# Patient Record
Sex: FEMALE | Race: WHITE | NOT HISPANIC OR LATINO
[De-identification: names, ages, dates, MRNs, and addresses within clinical notes are randomized per-mention and may not be internally consistent; named-entity substitution may affect disease eponyms.]

---

## 2021-02-12 ENCOUNTER — TRANSCRIPTION ENCOUNTER (OUTPATIENT)
Age: 56
End: 2021-02-12

## 2021-12-10 PROBLEM — Z00.00 ENCOUNTER FOR PREVENTIVE HEALTH EXAMINATION: Status: ACTIVE | Noted: 2021-12-10

## 2021-12-17 ENCOUNTER — NON-APPOINTMENT (OUTPATIENT)
Age: 56
End: 2021-12-17

## 2021-12-17 ENCOUNTER — APPOINTMENT (OUTPATIENT)
Dept: CARDIOLOGY | Facility: CLINIC | Age: 56
End: 2021-12-17
Payer: COMMERCIAL

## 2021-12-17 VITALS
HEART RATE: 96 BPM | RESPIRATION RATE: 14 BRPM | DIASTOLIC BLOOD PRESSURE: 82 MMHG | SYSTOLIC BLOOD PRESSURE: 164 MMHG | OXYGEN SATURATION: 98 % | BODY MASS INDEX: 21.17 KG/M2 | HEIGHT: 64 IN | TEMPERATURE: 98.2 F | WEIGHT: 124 LBS

## 2021-12-17 DIAGNOSIS — R94.31 ABNORMAL ELECTROCARDIOGRAM [ECG] [EKG]: ICD-10-CM

## 2021-12-17 PROCEDURE — 93000 ELECTROCARDIOGRAM COMPLETE: CPT

## 2021-12-17 PROCEDURE — 99204 OFFICE O/P NEW MOD 45 MIN: CPT | Mod: 25

## 2022-01-03 ENCOUNTER — NON-APPOINTMENT (OUTPATIENT)
Age: 57
End: 2022-01-03

## 2022-01-03 ENCOUNTER — APPOINTMENT (OUTPATIENT)
Dept: CARDIOLOGY | Facility: CLINIC | Age: 57
End: 2022-01-03
Payer: COMMERCIAL

## 2022-01-03 PROCEDURE — 93306 TTE W/DOPPLER COMPLETE: CPT

## 2022-01-10 NOTE — HISTORY OF PRESENT ILLNESS
[FreeTextEntry1] : 55 y/o w/ no past medical history\par \par requested evaluation for abnormal lipid panel\par \par Results:\par \par \par TG 64\par HDL 70\par [id number]\par \par BPs elevated today but pt was anxious prior BPs 120s/70s\par normal range.\par Pt denies any cardiac symptoms: no chest pain, dyspnea, palpitations, syncope, lightheadness, LE edema.\par \par Exercises >3x a week w/ a .\par Mom w/ stents in mid 60s otherwise no family history of cardiac disease.\par No hx of smoking ETOH illicits.\par \par ECG today w/ NSR & septal infarct pattern.\par This would not resolve w/ carefull attention to lead placement.

## 2022-01-10 NOTE — ASSESSMENT
[FreeTextEntry1] : A/P:\par \par *abnormal lipids\par -LDL elevated at 158\par but ASCVD risk 2.7% - not an indication for statin treatment.\par -Would only consider statin therapy w/ ASCVD risk 10%\par -Not even a level of risk in which coronary calcium scoring\par Lp (a) or cardiac CRP would be indicated - would consider\par this at 5-10%. \par \par -cont. to monitor lipids\par -Consider reevaluation & statin therapy\par if develops DM, HTN, or LDL >180-190.\par \par *abnormal ECG\par -septal infarct pattern, did not change\par w/ lead repositioning, doubt actual MI\par \par -Echo ordered to r/o pathology.\par Pt will call to review results.\par No return apt scheduled.\par ===================\par 1/10//'21\par Echo reviewed normal EF no RWMA\par no evidence of "septal infarct" seen on on ECG\par reviewed results w/ pt.\par

## 2022-06-12 ENCOUNTER — NON-APPOINTMENT (OUTPATIENT)
Age: 57
End: 2022-06-12

## 2022-06-14 ENCOUNTER — NON-APPOINTMENT (OUTPATIENT)
Age: 57
End: 2022-06-14

## 2022-10-07 ENCOUNTER — APPOINTMENT (OUTPATIENT)
Dept: CARDIOLOGY | Facility: CLINIC | Age: 57
End: 2022-10-07

## 2025-03-12 ENCOUNTER — EMERGENCY (EMERGENCY)
Facility: HOSPITAL | Age: 60
LOS: 0 days | Discharge: HOME | End: 2025-03-12
Attending: EMERGENCY MEDICINE
Payer: COMMERCIAL

## 2025-03-12 ENCOUNTER — TRANSCRIPTION ENCOUNTER (OUTPATIENT)
Age: 60
End: 2025-03-12

## 2025-03-12 VITALS — HEIGHT: 63 IN | WEIGHT: 135.14 LBS

## 2025-03-12 VITALS
HEART RATE: 93 BPM | OXYGEN SATURATION: 98 % | RESPIRATION RATE: 18 BRPM | DIASTOLIC BLOOD PRESSURE: 86 MMHG | SYSTOLIC BLOOD PRESSURE: 157 MMHG | TEMPERATURE: 98 F

## 2025-03-12 DIAGNOSIS — Z91.010 ALLERGY TO PEANUTS: ICD-10-CM

## 2025-03-12 DIAGNOSIS — H53.2 DIPLOPIA: ICD-10-CM

## 2025-03-12 LAB
ALBUMIN SERPL ELPH-MCNC: 4.3 G/DL — SIGNIFICANT CHANGE UP (ref 3.3–5)
ALP SERPL-CCNC: 100 U/L — SIGNIFICANT CHANGE UP (ref 40–120)
ALT FLD-CCNC: 19 U/L — SIGNIFICANT CHANGE UP (ref 12–78)
ANION GAP SERPL CALC-SCNC: 7 MMOL/L — SIGNIFICANT CHANGE UP (ref 5–17)
APTT BLD: 29.9 SEC — SIGNIFICANT CHANGE UP (ref 24.5–35.6)
AST SERPL-CCNC: 20 U/L — SIGNIFICANT CHANGE UP (ref 15–37)
B BURGDOR C6 AB SER-ACNC: NEGATIVE — SIGNIFICANT CHANGE UP
B BURGDOR IGG+IGM SER QL IB: SIGNIFICANT CHANGE UP
B BURGDOR IGG+IGM SER-ACNC: 0.33 INDEX — SIGNIFICANT CHANGE UP (ref 0.01–0.9)
BASOPHILS # BLD AUTO: 0.05 K/UL — SIGNIFICANT CHANGE UP (ref 0–0.2)
BASOPHILS NFR BLD AUTO: 0.7 % — SIGNIFICANT CHANGE UP (ref 0–2)
BILIRUB SERPL-MCNC: 0.6 MG/DL — SIGNIFICANT CHANGE UP (ref 0.2–1.2)
BUN SERPL-MCNC: 18 MG/DL — SIGNIFICANT CHANGE UP (ref 7–23)
CALCIUM SERPL-MCNC: 10 MG/DL — SIGNIFICANT CHANGE UP (ref 8.5–10.1)
CHLORIDE SERPL-SCNC: 103 MMOL/L — SIGNIFICANT CHANGE UP (ref 96–108)
CO2 SERPL-SCNC: 28 MMOL/L — SIGNIFICANT CHANGE UP (ref 22–31)
CREAT SERPL-MCNC: 0.79 MG/DL — SIGNIFICANT CHANGE UP (ref 0.5–1.3)
EGFR: 85 ML/MIN/1.73M2 — SIGNIFICANT CHANGE UP
EOSINOPHIL # BLD AUTO: 0.05 K/UL — SIGNIFICANT CHANGE UP (ref 0–0.5)
EOSINOPHIL NFR BLD AUTO: 0.7 % — SIGNIFICANT CHANGE UP (ref 0–6)
GLUCOSE SERPL-MCNC: 97 MG/DL — SIGNIFICANT CHANGE UP (ref 70–99)
HCT VFR BLD CALC: 42.2 % — SIGNIFICANT CHANGE UP (ref 34.5–45)
HGB BLD-MCNC: 14 G/DL — SIGNIFICANT CHANGE UP (ref 11.5–15.5)
IMM GRANULOCYTES # BLD AUTO: 0.02 K/UL — SIGNIFICANT CHANGE UP (ref 0–0.07)
IMM GRANULOCYTES NFR BLD AUTO: 0.3 % — SIGNIFICANT CHANGE UP (ref 0–0.9)
INR BLD: 0.94 RATIO — SIGNIFICANT CHANGE UP (ref 0.85–1.16)
LYMPHOCYTES # BLD AUTO: 1.54 K/UL — SIGNIFICANT CHANGE UP (ref 1–3.3)
LYMPHOCYTES NFR BLD AUTO: 21.9 % — SIGNIFICANT CHANGE UP (ref 13–44)
MCHC RBC-ENTMCNC: 30.1 PG — SIGNIFICANT CHANGE UP (ref 27–34)
MCHC RBC-ENTMCNC: 33.2 G/DL — SIGNIFICANT CHANGE UP (ref 32–36)
MCV RBC AUTO: 90.8 FL — SIGNIFICANT CHANGE UP (ref 80–100)
MONOCYTES # BLD AUTO: 0.38 K/UL — SIGNIFICANT CHANGE UP (ref 0–0.9)
MONOCYTES NFR BLD AUTO: 5.4 % — SIGNIFICANT CHANGE UP (ref 2–14)
NEUTROPHILS # BLD AUTO: 5 K/UL — SIGNIFICANT CHANGE UP (ref 1.8–7.4)
NEUTROPHILS NFR BLD AUTO: 71 % — SIGNIFICANT CHANGE UP (ref 43–77)
NRBC # BLD AUTO: 0 K/UL — SIGNIFICANT CHANGE UP (ref 0–0)
NRBC # FLD: 0 K/UL — SIGNIFICANT CHANGE UP (ref 0–0)
NRBC BLD AUTO-RTO: 0 /100 WBCS — SIGNIFICANT CHANGE UP (ref 0–0)
PLATELET # BLD AUTO: 375 K/UL — SIGNIFICANT CHANGE UP (ref 150–400)
PMV BLD: 9.9 FL — SIGNIFICANT CHANGE UP (ref 7–13)
POTASSIUM SERPL-MCNC: 3.8 MMOL/L — SIGNIFICANT CHANGE UP (ref 3.5–5.3)
POTASSIUM SERPL-SCNC: 3.8 MMOL/L — SIGNIFICANT CHANGE UP (ref 3.5–5.3)
PROT SERPL-MCNC: 7.7 GM/DL — SIGNIFICANT CHANGE UP (ref 6–8.3)
PROTHROM AB SERPL-ACNC: 11.1 SEC — SIGNIFICANT CHANGE UP (ref 9.9–13.4)
RBC # BLD: 4.65 M/UL — SIGNIFICANT CHANGE UP (ref 3.8–5.2)
RBC # FLD: 12.7 % — SIGNIFICANT CHANGE UP (ref 10.3–14.5)
SODIUM SERPL-SCNC: 138 MMOL/L — SIGNIFICANT CHANGE UP (ref 135–145)
TROPONIN I, HIGH SENSITIVITY RESULT: 3.34 NG/L — SIGNIFICANT CHANGE UP
WBC # BLD: 7.04 K/UL — SIGNIFICANT CHANGE UP (ref 3.8–10.5)
WBC # FLD AUTO: 7.04 K/UL — SIGNIFICANT CHANGE UP (ref 3.8–10.5)

## 2025-03-12 PROCEDURE — 85610 PROTHROMBIN TIME: CPT

## 2025-03-12 PROCEDURE — 36415 COLL VENOUS BLD VENIPUNCTURE: CPT

## 2025-03-12 PROCEDURE — 85730 THROMBOPLASTIN TIME PARTIAL: CPT

## 2025-03-12 PROCEDURE — 80053 COMPREHEN METABOLIC PANEL: CPT

## 2025-03-12 PROCEDURE — 70496 CT ANGIOGRAPHY HEAD: CPT | Mod: 26

## 2025-03-12 PROCEDURE — 93010 ELECTROCARDIOGRAM REPORT: CPT

## 2025-03-12 PROCEDURE — 82962 GLUCOSE BLOOD TEST: CPT

## 2025-03-12 PROCEDURE — 85025 COMPLETE CBC W/AUTO DIFF WBC: CPT

## 2025-03-12 PROCEDURE — 84484 ASSAY OF TROPONIN QUANT: CPT

## 2025-03-12 PROCEDURE — 99285 EMERGENCY DEPT VISIT HI MDM: CPT

## 2025-03-12 PROCEDURE — 71045 X-RAY EXAM CHEST 1 VIEW: CPT

## 2025-03-12 PROCEDURE — 99285 EMERGENCY DEPT VISIT HI MDM: CPT | Mod: 25

## 2025-03-12 PROCEDURE — 86618 LYME DISEASE ANTIBODY: CPT

## 2025-03-12 PROCEDURE — 93005 ELECTROCARDIOGRAM TRACING: CPT

## 2025-03-12 PROCEDURE — 87476 LYME DIS DNA AMP PROBE: CPT

## 2025-03-12 PROCEDURE — 70496 CT ANGIOGRAPHY HEAD: CPT | Mod: MC

## 2025-03-12 PROCEDURE — 70498 CT ANGIOGRAPHY NECK: CPT | Mod: MC

## 2025-03-12 PROCEDURE — 36000 PLACE NEEDLE IN VEIN: CPT | Mod: XU

## 2025-03-12 PROCEDURE — 71045 X-RAY EXAM CHEST 1 VIEW: CPT | Mod: 26

## 2025-03-12 PROCEDURE — 70498 CT ANGIOGRAPHY NECK: CPT | Mod: 26

## 2025-03-12 NOTE — ED STATDOCS - CARE PROVIDER_API CALL
Beulah Barrett  Neurology  [address] [facility], [place] [address]  Phone: [phone]  Fax: [phone]  Follow Up Time:

## 2025-03-12 NOTE — ED ADULT TRIAGE NOTE - CHIEF COMPLAINT QUOTE
Pt presents to ED c/o double vision since Sunday. pt reports double vision worsening, sent in by opthalmology to R/O stroke. Denies PMHX. denies weakness, slurred speech. Denies pain. Dr. Bergeron called for stroke eval. Pt presents to ED c/o double vision since Sunday. pt reports double vision worsening, sent in by opthalmology to R/O stroke. Denies PMHX. denies weakness, slurred speech. Denies pain. Dr. Bergeron called for stroke eval. No code stroke needed

## 2025-03-12 NOTE — ED ADULT NURSE NOTE - CHIEF COMPLAINT QUOTE
Pt presents to ED c/o double vision since Sunday. pt reports double vision worsening, sent in by opthalmology to R/O stroke. Denies PMHX. denies weakness, slurred speech. Denies pain. Dr. Bergeron called for stroke eval. No code stroke needed

## 2025-03-12 NOTE — ED ADULT NURSE NOTE - OBJECTIVE STATEMENT
pt presenting with reports of double vision sudden onset since Sunday. Pt states "its like a light switch went off", pt saw opthalmology and was referred to ER for scans. Pt denies headache, no other deficits, reports dizziness b/c of the double vision.

## 2025-03-12 NOTE — ED STATDOCS - PATIENT PORTAL LINK FT
You can access the FollowMyHealth Patient Portal offered by [facility] by registering at the following website: [email or web address]. By joining [facility]’s FollowMyHealth portal, you will also be able to view your health information using other applications (apps) compatible with our system.

## 2025-03-12 NOTE — ED STATDOCS - PHYSICAL EXAMINATION
Physical Exam:  Gen: NAD, non-toxic appearing, able to ambulate without assistance  Head: NCAT  HEENT: EOMI, PEERLA, normal conjunctiva, tongue midline, oral mucosa moist,  no focal neurological deficits,   Lung: CTAB, no respiratory distress, no wheezes/rhonchi/rales B/L, speaking in full sentences  CV: RRR, no murmurs, rubs or gallops, distal pulses 2+ b/l  Abd: soft, nontender, no distention, no guarding, no rigidity, no rebound tenderness  MSK: no visible deformities, ROM normal in UE/LE  Skin: Warm, well perfused, no rash  Psych: normal affect, calm

## 2025-03-12 NOTE — ED STATDOCS - ATTENDING APP SHARED VISIT CONTRIBUTION OF CARE
I,Robb Go MD,  performed the initial face to face bedside interview with this patient regarding history of present illness, review of symptoms and relevant past medical, social and family history.  I completed an independent physical examination.  I was the initial provider who evaluated this patient. I have signed out the follow up of any pending tests (i.e. labs, radiological studies) to the ACP.  I have communicated the patient’s plan of care and disposition with the ACP.  The history, relevant review of systems, past medical and surgical history, medical decision making, and physical examination was documented by the scribe in my presence and I attest to the accuracy of the documentation.

## 2025-03-12 NOTE — ED STATDOCS - OBJECTIVE STATEMENT
59 y/o F with no PMHx presents to ED SIB opthalmology to r/o stroke. Pt c/o double vision starting 3 days ago. Pt states double vision worsens in afternoon or evening.  Pt states she can see clearly out of L eye alone and out of R eye alone but she sees double vision when seeing out of both eyes.  Pt states she sees things on top of each other vertically. Pt states when she puts her glasses on and then takes them off, she has double vision. Pt had Prenuvo scan Sep/Oct 2024. Pt states she has a lot of deer in her backyard and would like to get tested for Lyme disease.

## 2025-03-12 NOTE — ED STATDOCS - PROGRESS NOTE DETAILS
61 y/o F with no PMHx presents to ED SIB opthalmology to r/o stroke. Pt c/o double vision starting 3 days ago. Pt states double vision worsens in afternoon or evening.  Pt states she can see clearly out of L eye alone and out of R eye alone but she sees double vision when seeing out of both eyes.  Pt states she sees things on top of each other vertically. Pt states when she puts her glasses on and then takes them off, she has double vision. Pt had Prenuvo scan Sep/Oct 2024. Pt states she has a lot of deer in her backyard and would like to get tested for Lyme disease.  Lidia Buckley PA-C CTA and labs unremarkable.  Pt. provided Disc for films to give her neurologist.  Return for any concerns.   Lidia Buckley PA-C

## 2025-03-12 NOTE — ED STATDOCS - CLINICAL SUMMARY MEDICAL DECISION MAKING FREE TEXT BOX
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 Pt reports symptoms come and go. EOMI intact. Obtain CTA brain and neck to eval. Re-eval. 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 Pt reports symptoms come and go. EOMI intact. Obtain CTA brain and neck to eval. Re-eval.  [place] exposure. Will send lyme titers.

## 2025-03-12 NOTE — ED STATDOCS - NSFOLLOWUPINSTRUCTIONS_ED_ALL_ED_FT
Diplopia    WHAT YOU NEED TO KNOW:    What is diplopia? Diplopia is double vision in one or both eyes. Diplopia may be caused by eye problems, eye surgery, or health conditions such as Graves disease or Parkinson disease. The cause of your diplopia may not be known.    How is diplopia diagnosed? Your healthcare provider will examine your eyes to check their position, vision, and movement. He or she will ask questions about the images you see.    How is diplopia treated?    Contacts or corrective lenses may be prescribed, or your current prescription may need to be updated.    A prism may be used to help your eye focus correctly. A prism is a wedge-shaped piece of glass or plastic that sticks onto an eyeglass lens.    An eye patch or eye shield may be used for a short time.  Eye Patch Eye Shield      Optical blurring is used to block your vision enough to remove the second image you see. Your provider blurs the eyeglass lens of your affected eye.    Surgery may be needed if other treatments do not work.  How can I manage my diplopia?    Do not drive until you know how the eye patch, prism, or other treatments affect you. Ask your provider for more information.    Manage any health condition that may be causing or worsening your diplopia. Take your medicine for the condition as directed.  When should I contact my healthcare provider?    Your vision worsens or does not improve, even after treatment.    You have questions or concerns about your condition or care.  CARE AGREEMENT:    You have the right to help plan your care. Learn about your health condition and how it may be treated. Discuss treatment options with your healthcare providers to decide what care you want to receive. You always have the right to refuse treatment.

## 2025-03-13 ENCOUNTER — TRANSCRIPTION ENCOUNTER (OUTPATIENT)
Age: 60
End: 2025-03-13

## 2025-03-17 LAB — B BURGDOR DNA SPEC QL NAA+PROBE: NEGATIVE — SIGNIFICANT CHANGE UP
